# Patient Record
Sex: MALE | Race: WHITE | NOT HISPANIC OR LATINO | Employment: FULL TIME | URBAN - METROPOLITAN AREA
[De-identification: names, ages, dates, MRNs, and addresses within clinical notes are randomized per-mention and may not be internally consistent; named-entity substitution may affect disease eponyms.]

---

## 2019-06-26 ENCOUNTER — APPOINTMENT (OUTPATIENT)
Dept: GENERAL RADIOLOGY | Facility: HOSPITAL | Age: 59
End: 2019-06-26

## 2019-06-26 ENCOUNTER — HOSPITAL ENCOUNTER (EMERGENCY)
Facility: HOSPITAL | Age: 59
Discharge: HOME | End: 2019-06-26
Attending: EMERGENCY MEDICINE | Admitting: EMERGENCY MEDICINE

## 2019-06-26 VITALS
OXYGEN SATURATION: 97 % | BODY MASS INDEX: 25.2 KG/M2 | DIASTOLIC BLOOD PRESSURE: 95 MMHG | HEART RATE: 69 BPM | HEIGHT: 71 IN | SYSTOLIC BLOOD PRESSURE: 160 MMHG | RESPIRATION RATE: 18 BRPM | TEMPERATURE: 98.1 F | WEIGHT: 180 LBS

## 2019-06-26 DIAGNOSIS — S61.315A LACERATION OF LEFT RING FINGER WITHOUT FOREIGN BODY WITH DAMAGE TO NAIL, INITIAL ENCOUNTER: Primary | ICD-10-CM

## 2019-06-26 PROCEDURE — 99283 EMERGENCY DEPT VISIT LOW MDM: CPT

## 2019-06-26 PROCEDURE — 25010000002 CEFTRIAXONE PER 250 MG: Performed by: EMERGENCY MEDICINE

## 2019-06-26 PROCEDURE — 73140 X-RAY EXAM OF FINGER(S): CPT

## 2019-06-26 PROCEDURE — 96372 THER/PROPH/DIAG INJ SC/IM: CPT

## 2019-06-26 RX ORDER — LIDOCAINE HYDROCHLORIDE 10 MG/ML
2.1 INJECTION, SOLUTION EPIDURAL; INFILTRATION; INTRACAUDAL; PERINEURAL ONCE
Status: COMPLETED | OUTPATIENT
Start: 2019-06-26 | End: 2019-06-26

## 2019-06-26 RX ORDER — CEPHALEXIN 500 MG/1
500 CAPSULE ORAL 4 TIMES DAILY
Qty: 40 CAPSULE | Refills: 0 | Status: SHIPPED | OUTPATIENT
Start: 2019-06-26 | End: 2020-10-05

## 2019-06-26 RX ORDER — CEFTRIAXONE 1 G/1
1000 INJECTION, POWDER, FOR SOLUTION INTRAMUSCULAR; INTRAVENOUS ONCE
Status: COMPLETED | OUTPATIENT
Start: 2019-06-26 | End: 2019-06-26

## 2019-06-26 RX ADMIN — LIDOCAINE HYDROCHLORIDE 2 ML: 10 INJECTION, SOLUTION EPIDURAL; INFILTRATION; INTRACAUDAL; PERINEURAL at 19:32

## 2019-06-26 RX ADMIN — CEFTRIAXONE SODIUM 1000 MG: 1 INJECTION, POWDER, FOR SOLUTION INTRAMUSCULAR; INTRAVENOUS at 19:29

## 2020-06-17 DIAGNOSIS — E11.65 UNCONTROLLED TYPE 2 DIABETES MELLITUS WITH HYPERGLYCEMIA (HCC): Primary | ICD-10-CM

## 2020-09-25 PROBLEM — M50.30 DDD (DEGENERATIVE DISC DISEASE), CERVICAL: Status: ACTIVE | Noted: 2019-12-23

## 2020-10-05 ENCOUNTER — OFFICE VISIT (OUTPATIENT)
Dept: ENDOCRINOLOGY | Facility: CLINIC | Age: 60
End: 2020-10-05

## 2020-10-05 VITALS
WEIGHT: 173 LBS | BODY MASS INDEX: 24.77 KG/M2 | OXYGEN SATURATION: 98 % | HEIGHT: 70 IN | TEMPERATURE: 98.6 F | HEART RATE: 70 BPM | DIASTOLIC BLOOD PRESSURE: 82 MMHG | SYSTOLIC BLOOD PRESSURE: 142 MMHG

## 2020-10-05 DIAGNOSIS — E55.9 VITAMIN D DEFICIENCY: ICD-10-CM

## 2020-10-05 DIAGNOSIS — E78.5 HYPERLIPIDEMIA, UNSPECIFIED HYPERLIPIDEMIA TYPE: ICD-10-CM

## 2020-10-05 DIAGNOSIS — Z79.4 TYPE 2 DIABETES MELLITUS WITH DIABETIC POLYNEUROPATHY, WITH LONG-TERM CURRENT USE OF INSULIN (HCC): Primary | ICD-10-CM

## 2020-10-05 DIAGNOSIS — E11.69 TYPE 2 DIABETES MELLITUS WITH OTHER SPECIFIED COMPLICATION, UNSPECIFIED WHETHER LONG TERM INSULIN USE (HCC): ICD-10-CM

## 2020-10-05 DIAGNOSIS — E11.42 TYPE 2 DIABETES MELLITUS WITH DIABETIC POLYNEUROPATHY, WITH LONG-TERM CURRENT USE OF INSULIN (HCC): Primary | ICD-10-CM

## 2020-10-05 PROBLEM — G89.29 CHRONIC LOW BACK PAIN: Status: ACTIVE | Noted: 2020-10-05

## 2020-10-05 PROBLEM — E11.9 TYPE 2 DIABETES MELLITUS (HCC): Status: ACTIVE | Noted: 2020-10-05

## 2020-10-05 PROBLEM — M54.50 CHRONIC LOW BACK PAIN: Status: ACTIVE | Noted: 2020-10-05

## 2020-10-05 PROBLEM — D18.03 HEMANGIOMA OF LIVER: Status: ACTIVE | Noted: 2020-10-05

## 2020-10-05 PROBLEM — I10 HYPERTENSION: Status: ACTIVE | Noted: 2020-10-05

## 2020-10-05 PROBLEM — G56.00 CARPAL TUNNEL SYNDROME: Status: ACTIVE | Noted: 2020-10-05

## 2020-10-05 LAB
GLUCOSE BLDC GLUCOMTR-MCNC: 194 MG/DL (ref 70–105)
GLUCOSE BLDC GLUCOMTR-MCNC: 194 MG/DL (ref 70–130)

## 2020-10-05 PROCEDURE — 82962 GLUCOSE BLOOD TEST: CPT | Performed by: INTERNAL MEDICINE

## 2020-10-05 PROCEDURE — 99204 OFFICE O/P NEW MOD 45 MIN: CPT | Performed by: INTERNAL MEDICINE

## 2020-10-05 RX ORDER — DAPAGLIFLOZIN 5 MG/1
5 TABLET, FILM COATED ORAL EVERY MORNING
COMMUNITY
Start: 2020-08-17 | End: 2020-10-05

## 2020-10-05 RX ORDER — DAPAGLIFLOZIN 5 MG/1
5 TABLET, FILM COATED ORAL EVERY MORNING
Start: 2020-10-05 | End: 2020-10-20 | Stop reason: SINTOL

## 2020-10-05 RX ORDER — ASPIRIN 81 MG/1
81 TABLET ORAL DAILY
COMMUNITY

## 2020-10-05 RX ORDER — LOSARTAN POTASSIUM 50 MG/1
50 TABLET ORAL 2 TIMES DAILY
COMMUNITY

## 2020-10-05 RX ORDER — PANTOPRAZOLE SODIUM 40 MG/1
40 TABLET, DELAYED RELEASE ORAL 2 TIMES DAILY
COMMUNITY
Start: 2020-08-13

## 2020-10-05 RX ORDER — ATORVASTATIN CALCIUM 40 MG/1
40 TABLET, FILM COATED ORAL DAILY
COMMUNITY

## 2020-10-05 RX ORDER — IRBESARTAN 75 MG/1
75 TABLET ORAL
COMMUNITY
Start: 2020-08-12 | End: 2020-10-05

## 2020-10-05 RX ORDER — NITROGLYCERIN 0.4 MG/1
0.4 TABLET SUBLINGUAL
COMMUNITY

## 2020-10-05 RX ORDER — INSULIN GLARGINE 100 [IU]/ML
INJECTION, SOLUTION SUBCUTANEOUS
COMMUNITY
Start: 2020-07-30

## 2020-10-05 RX ORDER — CARVEDILOL 3.12 MG/1
3.12 TABLET ORAL 2 TIMES DAILY
COMMUNITY
Start: 2020-07-16 | End: 2020-10-05

## 2020-10-05 RX ORDER — GLIPIZIDE 5 MG/1
5 TABLET ORAL DAILY
COMMUNITY
End: 2020-11-23 | Stop reason: SDUPTHER

## 2020-10-05 RX ORDER — AMLODIPINE BESYLATE 2.5 MG/1
2.5 TABLET ORAL DAILY
COMMUNITY
Start: 2020-09-17

## 2020-10-05 RX ORDER — SUCRALFATE 1 G/1
TABLET ORAL 4 TIMES DAILY
COMMUNITY
Start: 2020-09-16

## 2020-10-05 NOTE — PATIENT INSTRUCTIONS
See eye doctor as scheduled.  Take Lantus 5 units @ supper.  Take glipizide before breakfast.  After 1 week add Farxiga 5 mg before breakfast.  Drink plenty of water.  Eat 45-60 gm carbs per meal.  Take pantoprazole & sucralfate regularly.  Take Miralax 1/4 cap daily with water.  Call if blood sugars are running under 100 or over 200.  F/u in 6 months, with labs prior.

## 2020-10-06 ENCOUNTER — TELEPHONE (OUTPATIENT)
Dept: ENDOCRINOLOGY | Facility: CLINIC | Age: 60
End: 2020-10-06

## 2020-10-06 NOTE — TELEPHONE ENCOUNTER
Pt called and states that when he left here yesterday they went to [facility] for dinner and he ate his full 60 carbs and took his insulin. He said he felt bad all night, he checked his BS and it was 248. He woke up this morning and his stomach was just quivering and not well at all, he checked his blood sugar it was 147. He ate breakfast (30carbs) and then checked shortly afterwards and it was still holding at 147 for his BG. Please advise. He is concerned on the 60carbs still.

## 2020-10-06 NOTE — TELEPHONE ENCOUNTER
Called & talked to pt.  Needs to go easy on the carbs. Let the pantoprazole & carafate take effect first since he was not taking it before.  Feeling better now.  Continue same plan.

## 2020-10-11 PROBLEM — E55.9 VITAMIN D DEFICIENCY: Status: ACTIVE | Noted: 2020-10-11

## 2020-10-11 PROBLEM — E55.9 VITAMIN D DEFICIENCY: Status: ACTIVE | Noted: 2020-08-12

## 2020-10-12 NOTE — PROGRESS NOTES
[facility]    Referring Provider: Evelyn Garcia NP  Reason for Consultation: Diabetes evaluation & management.    Patient Care Team:  Evelyn Garcia APRN as PCP - General (Nurse Practitioner)    Chief complaint Diabetes (type 2)      Subjective .     History of present illness:    This is a  59 y.o. male with type 2 Diabetes diagnosed in April 2019 with polyuria & polydipsia. Blood sugar was >400.  Started on metformin. Stopped due to diarrhea. Switched to glipizide. Still taking it, 2 tabs ac breakfast.  Took Trulicity from 2016 to May 2020. Also took Farxiga.  On Lantus 5-15 units PRN.  Eats very few CHO, so not to increase blood sugar. Lost 20 lb in the last 3 months.  Started diabetes education in [place] this month.  Has a [employer] meter. Checks 3-4x/d. Blood sugars in the low 100's.  Main concern is constipation. Feels full.  Last eye exam in 2019.  MI in April 2019. Had a stent placed.   Spinal stenosis, pancreatitis in November 2019.  Had EGD end of 2019. PUD. Rx pantoprazole & sucralfate. Takes PRN.   Does not vit D.  Works 12h / day.    Review of Systems  Review of Systems   Constitutional: Positive for unexpected weight loss.   HENT: Negative for trouble swallowing.    Eyes: Negative for blurred vision.   Respiratory: Positive for shortness of breath.         Snoring   Gastrointestinal: Positive for constipation. Negative for nausea.   Endocrine: Positive for polydipsia. Negative for polyuria.   Neurological: Positive for numbness. Negative for headache.       History  Past Medical History:   Diagnosis Date   • Arthritis    • COPD (chronic obstructive pulmonary disease) (CMS/[facility])    • Hyperlipidemia    • Hypertension    • Pancreatitis 11/2019   • Pinched nerve    • Spinal stenosis    • Type 2 diabetes mellitus (CMS/[facility]) 04/2019     Past Surgical History:   Procedure Laterality Date   • CHOLECYSTECTOMY     • CORONARY STENT PLACEMENT  04/14/2019   • SHOULDER SURGERY      right   • TEETH  EXTRACTION      5/2020 infection afterwards     Family History   Problem Relation Age of Onset   • Hypertension Father    • Hyperlipidemia Father    • Cancer Father    • Diabetes Maternal Aunt    • Diabetes Maternal Grandmother      Social History     Tobacco Use   • Smoking status: Former Smoker   • Smokeless tobacco: Never Used   Substance Use Topics   • Alcohol use: Not Currently     Frequency: Never   • Drug use: Never       PRN Meds:    Allergies:  Carvedilol and Gabapentin    Objective     Vital Signs       Vitals:    10/05/20 1326   BP: 142/82   Pulse: 70   Temp: 98.6 °F (37 °C)   SpO2: 98%         Physical Exam:     General Appearance:    Alert, cooperative, in no acute distress   Head:    Normocephalic, without obvious abnormality, atraumatic   Eyes:            Lids and lashes normal, conjunctivae and sclerae normal, no   icterus, no pallor, corneas clear, PERRLA   Throat:   No oral lesions,  oral mucosa moist. 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    Lab Results (last 24 hours)     ** No results found for the last 24 hours. **        A1C 6.5%; Chol 163, Trigl 116; cr 0.91, K 4.4, ALT 27; TSH 1.6; vit D level 23.1 on 8/12/2020    Blood sugar by finger stick in clinic today 194 mg/dl 30' pp    Assessment/Plan    1. Diabetes type 2, with hyperglycemia & peripheral neuropathy  2. Hyperlipidemia, stable on Rx  3. Vitamin D Deficiency, untreated    See eye doctor as  scheduled.  Take Lantus 5 units @ supper.  Take glipizide before breakfast.  After 1 week add Farxiga 5 mg before breakfast.  Drink plenty of water.  Eat 45-60 gm carbs per meal.  Take pantoprazole & sucralfate regularly.  Take Miralax 1/4 cap daily with water.  Continue atorvastatin.  Take otc vit D 5,000 units/d.  Call if blood sugars are running under 100 or over 200.    I discussed the patients findings and my recommendations with patient.    Stan Malin MD  10/11/20  21:00 EDT

## 2020-10-13 ENCOUNTER — TELEPHONE (OUTPATIENT)
Dept: ENDOCRINOLOGY | Facility: CLINIC | Age: 60
End: 2020-10-13

## 2020-10-14 ENCOUNTER — TELEPHONE (OUTPATIENT)
Dept: ENDOCRINOLOGY | Facility: CLINIC | Age: 60
End: 2020-10-14

## 2020-10-14 NOTE — TELEPHONE ENCOUNTER
Called & left msg:  Stop Farxiga,  Give some time for the pantoprazole & sulcrafate to take effect & help the digestion.  No need to load up on carbs this fast. Back off to 45 gm per meal ( ~ 130 gm / d ).  Also, wanted to make sure he got the message to increase Lantus insulin to 10 units.

## 2020-10-14 NOTE — TELEPHONE ENCOUNTER
Patient called and states that he started farxiga on Monday, and since Monday he has been feeling sicker than a dog, left flank by his kidneys are burning and shaking and quivering and having a lot of issues with his digestion. He also said that he took in 185 carbs yesterday , which he knows that still isn't enough, but he just isn't sure what else he should do. He thinks he is having a side effect of the farxiga. He said its either that or its something between the glipizide and farxiga. One or the other. His blood sugars since starting the farxiga Monday.    10/14- 131 this morning so far.     10/13- breakfast 131, after breakfast 235, before lunch 117, after lunch 202, before supper 185, after supper 130, bedtime was 175.    10/12- 123 , after breakfast 146, before lunch 120, after lunch 180, before supper 139, after supper 140, bedtime 206.      He is doing 130-140 carbs and is trying to force himself to eat more.

## 2020-10-20 ENCOUNTER — TELEPHONE (OUTPATIENT)
Dept: ENDOCRINOLOGY | Facility: CLINIC | Age: 60
End: 2020-10-20

## 2020-10-27 ENCOUNTER — TELEPHONE (OUTPATIENT)
Dept: ENDOCRINOLOGY | Facility: CLINIC | Age: 60
End: 2020-10-27

## 2020-10-30 ENCOUNTER — TELEPHONE (OUTPATIENT)
Dept: ENDOCRINOLOGY | Facility: CLINIC | Age: 60
End: 2020-10-30

## 2020-11-03 ENCOUNTER — TELEPHONE (OUTPATIENT)
Dept: ENDOCRINOLOGY | Facility: CLINIC | Age: 60
End: 2020-11-03

## 2020-11-09 ENCOUNTER — TELEPHONE (OUTPATIENT)
Dept: ENDOCRINOLOGY | Facility: CLINIC | Age: 60
End: 2020-11-09

## 2020-11-13 ENCOUNTER — TELEPHONE (OUTPATIENT)
Dept: ENDOCRINOLOGY | Facility: CLINIC | Age: 60
End: 2020-11-13

## 2020-11-23 ENCOUNTER — TELEPHONE (OUTPATIENT)
Dept: ENDOCRINOLOGY | Facility: CLINIC | Age: 60
End: 2020-11-23

## 2020-11-23 RX ORDER — GLIPIZIDE 5 MG/1
TABLET ORAL
Start: 2020-11-23

## 2020-11-23 NOTE — TELEPHONE ENCOUNTER
BG's scanned into phone note.  Instructed pt to only call once a month since no changes have been made for several weeks.

## 2020-12-03 ENCOUNTER — TELEPHONE (OUTPATIENT)
Dept: ENDOCRINOLOGY | Facility: CLINIC | Age: 60
End: 2020-12-03

## 2020-12-03 NOTE — TELEPHONE ENCOUNTER
BG's scanned into phone note.  Per MD s/o, instructed to lower his Lantus to 9 units.  Pt verbalized an understanding

## 2020-12-07 ENCOUNTER — TELEPHONE (OUTPATIENT)
Dept: ENDOCRINOLOGY | Facility: CLINIC | Age: 60
End: 2020-12-07

## 2020-12-09 ENCOUNTER — TELEPHONE (OUTPATIENT)
Dept: ENDOCRINOLOGY | Facility: CLINIC | Age: 60
End: 2020-12-09

## 2020-12-09 NOTE — TELEPHONE ENCOUNTER
Pt stated that he has been fatigued at night and does not know why or if it has to do with blood sugars/insulin even though most of his readings are in range. Told pt he should consider seeing his PCP to figure out what could be causing that. A few BGs scanned into phone note.

## 2020-12-11 PROBLEM — E11.3293 MILD NONPROLIFERATIVE DIABETIC RETINOPATHY OF BOTH EYES WITHOUT MACULAR EDEMA ASSOCIATED WITH TYPE 2 DIABETES MELLITUS (HCC): Status: ACTIVE | Noted: 2020-12-11

## 2020-12-14 ENCOUNTER — TELEPHONE (OUTPATIENT)
Dept: ENDOCRINOLOGY | Facility: CLINIC | Age: 60
End: 2020-12-14

## 2020-12-15 ENCOUNTER — TELEPHONE (OUTPATIENT)
Dept: ENDOCRINOLOGY | Facility: CLINIC | Age: 60
End: 2020-12-15

## 2020-12-15 NOTE — TELEPHONE ENCOUNTER
Pt is having an angioscope done tomorrow at 11 AM and asked if he should still take his DM meds. Per s/o, told pt to still take lantus tonight at 6 PM as he normally would, and then hold the glipizide until after the procedure.

## 2020-12-28 ENCOUNTER — TELEPHONE (OUTPATIENT)
Dept: ENDOCRINOLOGY | Facility: CLINIC | Age: 60
End: 2020-12-28

## 2020-12-28 NOTE — TELEPHONE ENCOUNTER
"FYI. Pt called from [facility] and explained that he is there for \"health problems.\" Pt reported that he is receiving levemir while in the hospital along with novolog. Pt is adamant about wanting to be prescribed novolog once he is discharged. Explained to pt that hospital is following protocol and that he is likely receiving novolog because he is experiencing higher BGs while hospitalized. Also explained to pt that he we would need to see BGs upon D/C before making that type of decision and that a hospitalist would likely give a Rx for novolog if they felt necessary. Pt calls very frequently regarding his BGs.    "

## 2021-01-04 ENCOUNTER — TELEPHONE (OUTPATIENT)
Dept: ENDOCRINOLOGY | Facility: CLINIC | Age: 61
End: 2021-01-04

## 2021-01-04 DIAGNOSIS — Z79.4 TYPE 2 DIABETES MELLITUS WITH DIABETIC POLYNEUROPATHY, WITH LONG-TERM CURRENT USE OF INSULIN (HCC): Primary | ICD-10-CM

## 2021-01-04 DIAGNOSIS — E11.42 TYPE 2 DIABETES MELLITUS WITH DIABETIC POLYNEUROPATHY, WITH LONG-TERM CURRENT USE OF INSULIN (HCC): Primary | ICD-10-CM

## 2021-01-04 RX ORDER — INSULIN ASPART 100 [IU]/ML
INJECTION, SOLUTION INTRAVENOUS; SUBCUTANEOUS
Qty: 15 ML | Refills: 3 | Status: SHIPPED | OUTPATIENT
Start: 2021-01-04 | End: 2021-02-03 | Stop reason: CLARIF

## 2021-01-04 NOTE — TELEPHONE ENCOUNTER
Pt was D/C from [facility] on the 29th and was given a Novolog Flexpen to take home. He was instructed to give 2 units at meals and his sliding scale is as follows:    151-200 = 2 units  201-250 = 4 units  251-300 = 6 units  301-350 = 8 units  351-400 = 10 units     Pt is wanting you to write him an Rx for this and says that he is managing his BGs much better now. BGs scanned into phone note along with how many units insulin he gave. Do you want me to start an rx for this?

## 2021-02-03 RX ORDER — INSULIN LISPRO 100 [IU]/ML
INJECTION, SOLUTION INTRAVENOUS; SUBCUTANEOUS
Qty: 15 ML | Refills: 11 | Status: SHIPPED | OUTPATIENT
Start: 2021-02-03

## 2025-01-09 ENCOUNTER — TELEPHONE (OUTPATIENT)
Dept: ENDOCRINOLOGY | Facility: CLINIC | Age: 65
End: 2025-01-09
Payer: COMMERCIAL

## 2025-01-09 NOTE — TELEPHONE ENCOUNTER
PA submitted on covermymeds for Freestyle raúl 3 plus sensor  (Key: [id number])  Approved  Exp:1/9/2026